# Patient Record
Sex: FEMALE | Race: WHITE | NOT HISPANIC OR LATINO
[De-identification: names, ages, dates, MRNs, and addresses within clinical notes are randomized per-mention and may not be internally consistent; named-entity substitution may affect disease eponyms.]

---

## 2017-01-29 ENCOUNTER — TRANSCRIPTION ENCOUNTER (OUTPATIENT)
Age: 37
End: 2017-01-29

## 2018-03-08 ENCOUNTER — EMERGENCY (EMERGENCY)
Facility: HOSPITAL | Age: 38
LOS: 1 days | Discharge: HOME | End: 2018-03-08
Attending: EMERGENCY MEDICINE | Admitting: EMERGENCY MEDICINE
Payer: COMMERCIAL

## 2018-03-08 VITALS
OXYGEN SATURATION: 100 % | SYSTOLIC BLOOD PRESSURE: 130 MMHG | TEMPERATURE: 98 F | HEIGHT: 70 IN | WEIGHT: 164.91 LBS | DIASTOLIC BLOOD PRESSURE: 81 MMHG | HEART RATE: 85 BPM | RESPIRATION RATE: 20 BRPM

## 2018-03-08 DIAGNOSIS — Z79.899 OTHER LONG TERM (CURRENT) DRUG THERAPY: ICD-10-CM
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
## 2018-03-08 DIAGNOSIS — S93.402A SPRAIN OF UNSPECIFIED LIGAMENT OF LEFT ANKLE, INITIAL ENCOUNTER: ICD-10-CM
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
## 2018-03-08 DIAGNOSIS — M25.572 PAIN IN LEFT ANKLE AND JOINTS OF LEFT FOOT: ICD-10-CM
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
## 2018-03-08 PROCEDURE — 73610 X-RAY EXAM OF ANKLE: CPT | Mod: 26

## 2018-03-08 PROCEDURE — 73630 X-RAY EXAM OF FOOT: CPT | Mod: 26

## 2018-03-08 PROCEDURE — 73610 X-RAY EXAM OF ANKLE: CPT

## 2018-03-08 PROCEDURE — 99283 EMERGENCY DEPT VISIT LOW MDM: CPT | Mod: 25

## 2018-03-08 PROCEDURE — 73610 X-RAY EXAM OF ANKLE: CPT | Mod: 26,LT

## 2018-03-08 PROCEDURE — 99284 EMERGENCY DEPT VISIT MOD MDM: CPT | Mod: 25

## 2018-03-08 PROCEDURE — 73630 X-RAY EXAM OF FOOT: CPT | Mod: 26,LT

## 2018-03-08 PROCEDURE — 73630 X-RAY EXAM OF FOOT: CPT

## 2018-03-08 RX ORDER — OXYCODONE AND ACETAMINOPHEN 5; 325 MG/1; MG/1
2 TABLET ORAL ONCE
Qty: 0 | Refills: 0 | Status: DISCONTINUED | OUTPATIENT
Start: 2018-03-08 | End: 2018-03-08

## 2018-03-08 RX ADMIN — OXYCODONE AND ACETAMINOPHEN 2 TABLET(S): 5; 325 TABLET ORAL at 20:10

## 2018-03-08 NOTE — ED PROVIDER NOTE - MUSCULOSKELETAL MINIMAL EXAM
left lat malleolar swelling - pos tenderness ATFL-  no retrocalcaneal tenderness no achilles tenderness/RANGE OF MOTION LIMITED/neck supple

## 2018-03-08 NOTE — ED ADULT NURSE NOTE - OBJECTIVE STATEMENT
Patient arrived to ED via walk-in, AAOx3, in NAD, vitals stable, complaining of mechanical fall with injury to left ankle.  Patient states she was walking down the stairs and missed the bottom step.  Patient denies LOC, head trauma or any other complaints.  Will continue with plan of care.

## 2018-03-08 NOTE — ED PROVIDER NOTE - MEDICAL DECISION MAKING DETAILS
left ankle pain swelling lat malleolus- no obv  fx 36 yo f with left ankle pain swelling lat malleolus- no obv fx -will place in cam walker crutches  RICE therapy  ortho follow up in 3-5 days

## 2018-03-08 NOTE — ED PROVIDER NOTE - OBJECTIVE STATEMENT
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

## 2018-05-10 ENCOUNTER — TRANSCRIPTION ENCOUNTER (OUTPATIENT)
Age: 38
End: 2018-05-10

## 2024-04-04 NOTE — ED PROVIDER NOTE - CROS ED ALLERGIC IMMUN ALL NEG
1) [facility] call [phone] to schedule     2) trial of trulance instead of linzess     3) continue pelvic floor for bladder   May need to add rectal in future     4) urology follow up for the adrenal gland   [phone] Dr [place] and [facility]   
negative...